# Patient Record
Sex: FEMALE | URBAN - METROPOLITAN AREA
[De-identification: names, ages, dates, MRNs, and addresses within clinical notes are randomized per-mention and may not be internally consistent; named-entity substitution may affect disease eponyms.]

---

## 2022-11-07 ENCOUNTER — APPOINTMENT (RX ONLY)
Dept: URBAN - METROPOLITAN AREA CLINIC 2 | Facility: CLINIC | Age: 32
Setting detail: DERMATOLOGY
End: 2022-11-07

## 2022-11-07 DIAGNOSIS — L65.9 NONSCARRING HAIR LOSS, UNSPECIFIED: ICD-10-CM

## 2022-11-07 PROCEDURE — ? ORDER TESTS

## 2022-11-07 PROCEDURE — ? COUNSELING

## 2022-11-07 PROCEDURE — 99203 OFFICE O/P NEW LOW 30 MIN: CPT

## 2022-11-07 PROCEDURE — ? DEFER

## 2022-11-07 NOTE — PROCEDURE: ORDER TESTS
Billing Type: [employer]
Bill For Surgical Tray: no
Performing Laboratory: 0
Expected Date Of Service: 11/07/2022

## 2022-11-07 NOTE — PROCEDURE: DEFER
Detail Level: Detailed
X Size Of Lesion In Cm (Optional): 0
Procedure To Be Performed At Next Visit: Biopsy by punch method
Instructions (Optional): Punch bx of scalp
Introduction Text (Please End With A Colon): The following procedure was deferred:

## 2022-11-28 ENCOUNTER — APPOINTMENT (RX ONLY)
Dept: URBAN - METROPOLITAN AREA CLINIC 2 | Facility: CLINIC | Age: 32
Setting detail: DERMATOLOGY
End: 2022-11-28

## 2022-11-28 DIAGNOSIS — L65.9 NONSCARRING HAIR LOSS, UNSPECIFIED: ICD-10-CM

## 2022-11-28 DIAGNOSIS — D22 MELANOCYTIC NEVI: ICD-10-CM

## 2022-11-28 DIAGNOSIS — L85.3 XEROSIS CUTIS: ICD-10-CM

## 2022-11-28 PROBLEM — D22.9 MELANOCYTIC NEVI, UNSPECIFIED: Status: ACTIVE | Noted: 2022-11-28

## 2022-11-28 PROCEDURE — ? BIOPSY BY PUNCH METHOD

## 2022-11-28 PROCEDURE — ? COUNSELING

## 2022-11-28 PROCEDURE — 99213 OFFICE O/P EST LOW 20 MIN: CPT | Mod: 25

## 2022-11-28 PROCEDURE — 11104 PUNCH BX SKIN SINGLE LESION: CPT

## 2022-11-28 PROCEDURE — ? LAB REPORTS REVIEWED

## 2022-11-28 ASSESSMENT — LOCATION DETAILED DESCRIPTION DERM: LOCATION DETAILED: POSTERIOR MID-PARIETAL SCALP

## 2022-11-28 ASSESSMENT — LOCATION SIMPLE DESCRIPTION DERM: LOCATION SIMPLE: POSTERIOR SCALP

## 2022-11-28 ASSESSMENT — LOCATION ZONE DERM: LOCATION ZONE: SCALP

## 2022-11-28 NOTE — PROCEDURE: BIOPSY BY PUNCH METHOD
Body Location Override (Optional - Billing Will Still Be Based On Selected Body Map Location If Applicable): vertex
Detail Level: Detailed
Was A Bandage Applied: Yes
Punch Size In Mm: 3
Biopsy Type: H and E
Anesthesia Type: 1% lidocaine with epinephrine
Anesthesia Volume In Cc (Will Not Render If 0): 2
Additional Anesthesia Volume In Cc (Will Not Render If 0): 0
Hemostasis: Electrocautery
Epidermal Sutures: 5-0 Nylon
Wound Care: Bacitracin
Dressing: bandage
Suture Removal: 14 days
Patient Will Remove Sutures At Home?: No
Lab: -111
Path Notes (To The Dermatopathologist): 3mm r/o scarring alopecia vs non scarring alopecia
Consent: Written consent was obtained and risks were reviewed including but not limited to scarring, infection, bleeding, scabbing, incomplete removal, nerve damage and allergy to anesthesia.
Post-Care Instructions: I reviewed with the patient in detail post-care instructions. Patient is to keep the biopsy site dry overnight, and then apply bacitracin twice daily until healed. Patient may apply hydrogen peroxide soaks to remove any crusting.
Home Suture Removal Text: Patient was provided a home suture removal kit and will remove their sutures at home. If they have any questions or difficulties they will call the office.
Notification Instructions: Patient will be notified of biopsy results. However, patient instructed to call the office if not contacted within 2 weeks.
Billing Type: [employer]
Information: Selecting Yes will display possible errors in your note based on the variables you have selected. This validation is only offered as a suggestion for you. PLEASE NOTE THAT THE VALIDATION TEXT WILL BE REMOVED WHEN YOU FINALIZE YOUR NOTE. IF YOU WANT TO FAX A PRELIMINARY NOTE YOU WILL NEED TO TOGGLE THIS TO 'NO' IF YOU DO NOT WANT IT IN YOUR FAXED NOTE.

## 2022-12-19 ENCOUNTER — APPOINTMENT (RX ONLY)
Dept: URBAN - METROPOLITAN AREA CLINIC 2 | Facility: CLINIC | Age: 32
Setting detail: DERMATOLOGY
End: 2022-12-19

## 2022-12-19 DIAGNOSIS — L64.8 OTHER ANDROGENIC ALOPECIA: ICD-10-CM

## 2022-12-19 PROCEDURE — ? PRESCRIPTION

## 2022-12-19 PROCEDURE — ? PATHOLOGY DISCUSSION

## 2022-12-19 PROCEDURE — ? TREATMENT REGIMEN

## 2022-12-19 PROCEDURE — ? COUNSELING

## 2022-12-19 PROCEDURE — 99213 OFFICE O/P EST LOW 20 MIN: CPT

## 2022-12-19 RX ORDER — MINOXIDIL 5 %
SOLUTION, NON-ORAL TOPICAL
Qty: 180 | Refills: 0 | Status: ERX | COMMUNITY
Start: 2022-12-19

## 2022-12-19 RX ADMIN — Medication: at 00:00

## 2023-01-19 ENCOUNTER — APPOINTMENT (RX ONLY)
Dept: URBAN - METROPOLITAN AREA CLINIC 2 | Facility: CLINIC | Age: 33
Setting detail: DERMATOLOGY
End: 2023-01-19

## 2023-01-19 DIAGNOSIS — L64.8 OTHER ANDROGENIC ALOPECIA: ICD-10-CM

## 2023-01-19 PROCEDURE — 99213 OFFICE O/P EST LOW 20 MIN: CPT

## 2023-01-19 PROCEDURE — ? PRESCRIPTION

## 2023-01-19 PROCEDURE — ? TREATMENT REGIMEN

## 2023-01-19 PROCEDURE — ? OTHER

## 2023-01-19 PROCEDURE — ? COUNSELING

## 2023-01-19 PROCEDURE — ? PATHOLOGY DISCUSSION

## 2023-01-19 RX ORDER — MINOXIDIL 5 %
SOLUTION, NON-ORAL TOPICAL
Qty: 180 | Refills: 0 | Status: ERX

## 2023-01-19 NOTE — PROCEDURE: OTHER
Render Risk Assessment In Note?: no
Other (Free Text): Informed pt hair growth on face wants 5%
Note Text (......Xxx Chief Complaint.): This diagnosis correlates with the
Detail Level: Zone

## 2023-01-19 NOTE — PROCEDURE: TREATMENT REGIMEN
Detail Level: Zone
Initiate Treatment: minoxidil 5% topical solution \\nApply to scalp BID
Modify Regimen: minoxidil 2 % topical solution \\nApply to scalp BID

## 2023-02-17 ENCOUNTER — APPOINTMENT (RX ONLY)
Dept: URBAN - METROPOLITAN AREA CLINIC 2 | Facility: CLINIC | Age: 33
Setting detail: DERMATOLOGY
End: 2023-02-17

## 2023-02-17 DIAGNOSIS — L64.8 OTHER ANDROGENIC ALOPECIA: ICD-10-CM

## 2023-02-17 PROCEDURE — ? PATHOLOGY DISCUSSION

## 2023-02-17 PROCEDURE — ? PRESCRIPTION

## 2023-02-17 PROCEDURE — ? TREATMENT REGIMEN

## 2023-02-17 PROCEDURE — ? COUNSELING

## 2023-02-17 PROCEDURE — ? OTHER

## 2023-02-17 PROCEDURE — 99213 OFFICE O/P EST LOW 20 MIN: CPT

## 2023-02-17 RX ORDER — MINOXIDIL 5 %
SOLUTION, NON-ORAL TOPICAL
Qty: 180 | Refills: 0 | Status: ERX

## 2025-07-17 ENCOUNTER — HOSPITAL ENCOUNTER (OUTPATIENT)
Dept: HOSPITAL 104 - CDIM | Age: 35
Discharge: HOME | End: 2025-07-17
Payer: MEDICAID

## 2025-07-17 DIAGNOSIS — E03.9: Primary | ICD-10-CM

## 2025-07-17 PROCEDURE — 76536 US EXAM OF HEAD AND NECK: CPT
